# Patient Record
Sex: FEMALE | Race: WHITE | NOT HISPANIC OR LATINO | Employment: FULL TIME | URBAN - METROPOLITAN AREA
[De-identification: names, ages, dates, MRNs, and addresses within clinical notes are randomized per-mention and may not be internally consistent; named-entity substitution may affect disease eponyms.]

---

## 2017-08-02 ENCOUNTER — APPOINTMENT (OUTPATIENT)
Dept: WOMENS IMAGING | Facility: HOSPITAL | Age: 57
End: 2017-08-02

## 2017-08-02 PROCEDURE — 77063 BREAST TOMOSYNTHESIS BI: CPT | Performed by: RADIOLOGY

## 2017-08-02 PROCEDURE — G0202 SCR MAMMO BI INCL CAD: HCPCS | Performed by: RADIOLOGY

## 2017-08-02 PROCEDURE — MDREVIEWSP: Performed by: RADIOLOGY

## 2017-08-02 PROCEDURE — 77067 SCR MAMMO BI INCL CAD: CPT | Performed by: RADIOLOGY

## 2017-08-15 ENCOUNTER — HOSPITAL ENCOUNTER (OUTPATIENT)
Facility: HOSPITAL | Age: 57
Setting detail: HOSPITAL OUTPATIENT SURGERY
End: 2017-08-15
Attending: OBSTETRICS & GYNECOLOGY | Admitting: OBSTETRICS & GYNECOLOGY

## 2017-08-22 ENCOUNTER — APPOINTMENT (OUTPATIENT)
Dept: PREADMISSION TESTING | Facility: HOSPITAL | Age: 57
End: 2017-08-22

## 2017-08-22 VITALS
HEART RATE: 78 BPM | WEIGHT: 127 LBS | RESPIRATION RATE: 20 BRPM | TEMPERATURE: 98 F | OXYGEN SATURATION: 97 % | HEIGHT: 63 IN | DIASTOLIC BLOOD PRESSURE: 77 MMHG | BODY MASS INDEX: 22.5 KG/M2 | SYSTOLIC BLOOD PRESSURE: 122 MMHG

## 2017-08-22 LAB
B-HCG UR QL: NEGATIVE
BASOPHILS # BLD AUTO: 0.05 10*3/MM3 (ref 0–0.2)
BASOPHILS NFR BLD AUTO: 0.9 % (ref 0–1.5)
DEPRECATED RDW RBC AUTO: 44.9 FL (ref 37–54)
EOSINOPHIL # BLD AUTO: 0.09 10*3/MM3 (ref 0–0.7)
EOSINOPHIL NFR BLD AUTO: 1.6 % (ref 0.3–6.2)
ERYTHROCYTE [DISTWIDTH] IN BLOOD BY AUTOMATED COUNT: 13 % (ref 11.7–13)
HCT VFR BLD AUTO: 42.1 % (ref 35.6–45.5)
HGB BLD-MCNC: 13.6 G/DL (ref 11.9–15.5)
IMM GRANULOCYTES # BLD: 0 10*3/MM3 (ref 0–0.03)
IMM GRANULOCYTES NFR BLD: 0 % (ref 0–0.5)
LYMPHOCYTES # BLD AUTO: 1.74 10*3/MM3 (ref 0.9–4.8)
LYMPHOCYTES NFR BLD AUTO: 31.1 % (ref 19.6–45.3)
MCH RBC QN AUTO: 30.6 PG (ref 26.9–32)
MCHC RBC AUTO-ENTMCNC: 32.3 G/DL (ref 32.4–36.3)
MCV RBC AUTO: 94.6 FL (ref 80.5–98.2)
MONOCYTES # BLD AUTO: 0.55 10*3/MM3 (ref 0.2–1.2)
MONOCYTES NFR BLD AUTO: 9.8 % (ref 5–12)
NEUTROPHILS # BLD AUTO: 3.17 10*3/MM3 (ref 1.9–8.1)
NEUTROPHILS NFR BLD AUTO: 56.6 % (ref 42.7–76)
PLATELET # BLD AUTO: 267 10*3/MM3 (ref 140–500)
PMV BLD AUTO: 9.1 FL (ref 6–12)
RBC # BLD AUTO: 4.45 10*6/MM3 (ref 3.9–5.2)
WBC NRBC COR # BLD: 5.6 10*3/MM3 (ref 4.5–10.7)

## 2017-08-22 PROCEDURE — 36415 COLL VENOUS BLD VENIPUNCTURE: CPT

## 2017-08-22 PROCEDURE — 81025 URINE PREGNANCY TEST: CPT | Performed by: OBSTETRICS & GYNECOLOGY

## 2017-08-22 PROCEDURE — 85025 COMPLETE CBC W/AUTO DIFF WBC: CPT | Performed by: OBSTETRICS & GYNECOLOGY

## 2017-08-22 RX ORDER — LEVALBUTEROL TARTRATE 45 UG/1
1-2 AEROSOL, METERED ORAL EVERY 4 HOURS PRN
COMMUNITY
End: 2021-05-20

## 2017-08-22 RX ORDER — CETIRIZINE HYDROCHLORIDE 10 MG/1
10 TABLET ORAL AS NEEDED
COMMUNITY

## 2017-08-22 NOTE — DISCHARGE INSTRUCTIONS
Take the following medications the morning of surgery with a small sip of water:  INHALER    ARRIVAL TIME 0730 TO [facility]        General Instructions:  • Do not eat or drink anything after midnight the night before surgery.  • Infants may have breast milk up to four hours before surgery.  • Infants drinking formula may drink formula up to six hours before surgery.   • Patients who avoid smoking, chewing tobacco and alcohol for 4 weeks prior to surgery have a reduced risk of post-operative complications.  Quit smoking as many days before surgery as you can.  • Do not smoke, use chewing tobacco or drink alcohol the day of surgery.   • If applicable bring your C-PAP/ BI-PAP machine.  • Bring any papers given to you in the doctor’s office.  • Wear clean comfortable clothes and socks.  • Do not wear contact lenses or make-up.  Bring a case for your glasses.   • Bring crutches or walker if applicable.  • Leave all other valuables and jewelry at home.  • The Pre-Admission Testing nurse will instruct you to bring medications if unable to obtain an accurate list in Pre-Admission Testing.            Preventing a Surgical Site Infection:  • For 2 to 3 days before surgery, avoid shaving with a razor because the razor can irritate skin and make it easier to develop an infection.  • The night prior to surgery sleep in a clean bed with clean clothing.  Do not allow pets to sleep with you.  • Shower on the morning of surgery using a fresh bar of anti-bacterial soap (such as Dial) and clean washcloth.  Dry with a clean towel and dress in clean clothing.  • Ask your surgeon if you will be receiving antibiotics prior to surgery.  • Make sure you, your family, and all healthcare providers clean their hands with soap and water or an alcohol based hand  before caring for you or your wound.    Day of surgery:  Upon arrival, a Pre-op nurse and Anesthesiologist will review your health history, obtain vital signs, and  answer questions you may have.  The only belongings needed at this time will be your home medications and if applicable your C-PAP/BI-PAP machine.  If you are staying overnight your family can leave the rest of your belongings in the car and bring them to your room later.  A Pre-op nurse will start an IV and you may receive medication in preparation for surgery, including something to help you relax.  Your family will be able to see you in the Pre-op area.  While you are in surgery your family should notify the waiting room  if they leave the waiting room area and provide a contact phone number.    Please be aware that surgery does come with discomfort.  We want to make every effort to control your discomfort so please discuss any uncontrolled symptoms with your nurse.   Your doctor will most likely have prescribed pain medications.      If you are going home after surgery you will receive individualized written care instructions before being discharged.  A responsible adult must drive you to and from the hospital on the day of your surgery and stay with you for 24 hours.    If you are staying overnight following surgery, you will be transported to your hospital room following the recovery period.  [facility] has all private rooms.    If you have any questions please call Pre-Admission Testing at [phone].  Deductibles and co-payments are collected on the day of service. Please be prepared to pay the required co-pay, deductible or deposit on the day of service as defined by your plan.

## 2017-12-15 ENCOUNTER — OFFICE VISIT (OUTPATIENT)
Dept: INTERNAL MEDICINE | Facility: CLINIC | Age: 57
End: 2017-12-15

## 2017-12-15 VITALS
HEART RATE: 80 BPM | SYSTOLIC BLOOD PRESSURE: 118 MMHG | OXYGEN SATURATION: 99 % | DIASTOLIC BLOOD PRESSURE: 70 MMHG | RESPIRATION RATE: 18 BRPM | WEIGHT: 129 LBS | BODY MASS INDEX: 22.86 KG/M2 | HEIGHT: 63 IN

## 2017-12-15 DIAGNOSIS — J01.00 ACUTE NON-RECURRENT MAXILLARY SINUSITIS: Primary | ICD-10-CM

## 2017-12-15 DIAGNOSIS — J40 BRONCHITIS: ICD-10-CM

## 2017-12-15 PROCEDURE — 99213 OFFICE O/P EST LOW 20 MIN: CPT | Performed by: INTERNAL MEDICINE

## 2017-12-15 RX ORDER — METHYLPREDNISOLONE 4 MG/1
TABLET ORAL
Qty: 21 EACH | Refills: 0 | Status: SHIPPED | OUTPATIENT
Start: 2017-12-15 | End: 2019-02-12

## 2017-12-15 RX ORDER — INFLUENZA A VIRUS A/SINGAPORE/GP1908/2015 IVR-180 (H1N1) ANTIGEN (MDCK CELL DERIVED, PROPIOLACTONE INACTIVATED), INFLUENZA A VIRUS A/NORTH CAROLINA/04/2016 (H3N2) HEMAGGLUTININ ANTIGEN (MDCK CELL DERIVED, PROPIOLACTONE INACTIVATED), INFLUENZA B VIRUS B/IOWA/06/2017 HEMAGGLUTININ ANTIGEN (MDCK CELL DERIVED, PROPIOLACTONE INACTIVATED), INFLUENZA B VIRUS B/SINGAPORE/INFTT-16-0610/2016 HEMAGGLUTININ ANTIGEN (MDCK CELL DERIVED, PROPIOLACTONE INACTIVATED) 15; 15; 15; 15 UG/.5ML; UG/.5ML; UG/.5ML; UG/.5ML
INJECTION, SUSPENSION INTRAMUSCULAR
Refills: 0 | COMMUNITY
Start: 2017-09-25 | End: 2019-02-12

## 2017-12-15 RX ORDER — GUAIFENESIN AND CODEINE PHOSPHATE 100; 10 MG/5ML; MG/5ML
5 SOLUTION ORAL 3 TIMES DAILY PRN
Qty: 200 ML | Refills: 1 | Status: SHIPPED | OUTPATIENT
Start: 2017-12-15 | End: 2019-02-12

## 2017-12-15 RX ORDER — AZITHROMYCIN 250 MG/1
TABLET, FILM COATED ORAL
Qty: 5 TABLET | Refills: 0 | Status: SHIPPED | OUTPATIENT
Start: 2017-12-15 | End: 2019-02-12

## 2017-12-15 NOTE — PROGRESS NOTES
Chief Complaint   Patient presents with   • URI     congestion, headache, cough, x 2 weeks       History of Present Illness   Cecilia Mejia is a 57 y.o. female presents for acute care. C/o cough and congestion. Sinus pain and pressure. Vocal changes. Started about 21 days ago and worsening from that time. Has tried hydration, green tea, and mucinex without lasting benefit. Unable to sleep at night due to cough and it is worse in the evening and early morning. Reports low grade fever to 100.0.     The following portions of the patient's history were reviewed and updated as appropriate: allergies, current medications, past family history, past medical history, past social history, past surgical history and problem list.  Current Outpatient Prescriptions on File Prior to Visit   Medication Sig Dispense Refill   • cetirizine (zyrTEC) 10 MG tablet Take 10 mg by mouth As Needed for Allergies.     • Cholecalciferol (VITAMIN D PO) Take 1,000 Units by mouth Daily.     • Cyanocobalamin (VITAMIN B-12 SL) Place  under the tongue Daily.     • desonide (DESONATE) 0.05 % gel Apply 1 application topically As Needed.     • levalbuterol (XOPENEX HFA) 45 MCG/ACT inhaler Inhale 1-2 puffs Every 4 (Four) Hours As Needed for Wheezing.     • Tretinoin Microsphere Pump 0.04 % gel Place  on the skin As Needed.       No current facility-administered medications on file prior to visit.      Review of Systems   Constitutional: Positive for fatigue and fever.   HENT: Positive for postnasal drip, rhinorrhea, sinus pain, sinus pressure, sneezing, sore throat and voice change.    Eyes: Negative.    Respiratory: Positive for cough and wheezing.    Cardiovascular: Negative.    Gastrointestinal: Negative.    Endocrine: Negative.    Genitourinary: Negative.    Musculoskeletal: Negative for arthralgias.   Skin: Negative.    Allergic/Immunologic: Negative.    Neurological: Negative.    Hematological: Negative.    Psychiatric/Behavioral: Negative.   "      Objective   Physical Exam   Constitutional: She is oriented to person, place, and time. She appears well-developed and well-nourished.   Alert. No distress. Ill appearing.    HENT:   Head: Normocephalic and atraumatic.   Right Ear: External ear normal.   Left Ear: External ear normal.   Pharyngeal erythema   Eyes: EOM are normal. Pupils are equal, round, and reactive to light.   Neck: Normal range of motion. Neck supple.   Cardiovascular: Normal rate, regular rhythm, normal heart sounds and intact distal pulses.    Pulmonary/Chest: Effort normal and breath sounds normal.   Persistent cough   Abdominal: Soft. Bowel sounds are normal.   Musculoskeletal: Normal range of motion.   Neurological: She is alert and oriented to person, place, and time. She has normal reflexes.   Skin: Skin is warm and dry.   Psychiatric: She has a normal mood and affect. Her behavior is normal. Judgment and thought content normal.        /70  Pulse 80  Resp 18  Ht 160 cm (62.99\")  Wt 58.5 kg (129 lb)  SpO2 99%  BMI 22.86 kg/m2    Assessment/Plan   Diagnoses and all orders for this visit:    Acute non-recurrent maxillary sinusitis    Bronchitis    Other orders  -     FLUCELVAX QUADRIVALENT 0.5 ML suspension prefilled syringe injection; INJECT ONE INFLUENZA VACCINE PER PROTOCOL  -     azithromycin (ZITHROMAX) 250 MG tablet; Take 2 tablets the first day, then 1 tablet daily for 4 days.  -     MethylPREDNISolone (MEDROL, LUZ MARIA,) 4 MG tablet; Take as directed on package instructions.  -     guaifenesin-codeine (GUAIFENESIN AC) 100-10 MG/5ML liquid; Take 5 mL by mouth 3 (Three) Times a Day As Needed for Cough.             "

## 2018-07-06 ENCOUNTER — OFFICE VISIT (OUTPATIENT)
Dept: INTERNAL MEDICINE | Facility: CLINIC | Age: 58
End: 2018-07-06

## 2018-07-06 ENCOUNTER — HOSPITAL ENCOUNTER (OUTPATIENT)
Dept: CARDIOLOGY | Facility: HOSPITAL | Age: 58
Discharge: HOME | End: 2018-07-06
Admitting: INTERNAL MEDICINE

## 2018-07-06 VITALS
HEART RATE: 86 BPM | SYSTOLIC BLOOD PRESSURE: 110 MMHG | HEIGHT: 63 IN | OXYGEN SATURATION: 98 % | DIASTOLIC BLOOD PRESSURE: 70 MMHG | BODY MASS INDEX: 23.92 KG/M2 | WEIGHT: 135 LBS

## 2018-07-06 DIAGNOSIS — I83.93 VARICOSE VEINS OF BOTH LOWER EXTREMITIES: ICD-10-CM

## 2018-07-06 DIAGNOSIS — M79.89 LEFT LEG SWELLING: ICD-10-CM

## 2018-07-06 DIAGNOSIS — M79.662 PAIN OF LEFT CALF: ICD-10-CM

## 2018-07-06 DIAGNOSIS — M79.662 PAIN OF LEFT CALF: Primary | ICD-10-CM

## 2018-07-06 LAB
BH CV LOWER VASCULAR LEFT COMMON FEMORAL AUGMENT: NORMAL
BH CV LOWER VASCULAR LEFT COMMON FEMORAL COMPETENT: NORMAL
BH CV LOWER VASCULAR LEFT COMMON FEMORAL COMPRESS: NORMAL
BH CV LOWER VASCULAR LEFT COMMON FEMORAL PHASIC: NORMAL
BH CV LOWER VASCULAR LEFT COMMON FEMORAL SPONT: NORMAL
BH CV LOWER VASCULAR LEFT DISTAL FEMORAL COMPRESS: NORMAL
BH CV LOWER VASCULAR LEFT GASTRONEMIUS COMPRESS: NORMAL
BH CV LOWER VASCULAR LEFT GREATER SAPH AK COMPRESS: NORMAL
BH CV LOWER VASCULAR LEFT GREATER SAPH BK COMPRESS: NORMAL
BH CV LOWER VASCULAR LEFT LESSER SAPH COMPRESS: NORMAL
BH CV LOWER VASCULAR LEFT MID FEMORAL AUGMENT: NORMAL
BH CV LOWER VASCULAR LEFT MID FEMORAL COMPETENT: NORMAL
BH CV LOWER VASCULAR LEFT MID FEMORAL COMPRESS: NORMAL
BH CV LOWER VASCULAR LEFT MID FEMORAL PHASIC: NORMAL
BH CV LOWER VASCULAR LEFT MID FEMORAL SPONT: NORMAL
BH CV LOWER VASCULAR LEFT PERONEAL COMPRESS: NORMAL
BH CV LOWER VASCULAR LEFT POPLITEAL AUGMENT: NORMAL
BH CV LOWER VASCULAR LEFT POPLITEAL COMPETENT: NORMAL
BH CV LOWER VASCULAR LEFT POPLITEAL COMPRESS: NORMAL
BH CV LOWER VASCULAR LEFT POPLITEAL PHASIC: NORMAL
BH CV LOWER VASCULAR LEFT POPLITEAL SPONT: NORMAL
BH CV LOWER VASCULAR LEFT POSTERIOR TIBIAL COMPRESS: NORMAL
BH CV LOWER VASCULAR LEFT PROXIMAL FEMORAL COMPRESS: NORMAL
BH CV LOWER VASCULAR LEFT SAPHENOFEMORAL JUNCTION AUGMENT: NORMAL
BH CV LOWER VASCULAR LEFT SAPHENOFEMORAL JUNCTION COMPETENT: NORMAL
BH CV LOWER VASCULAR LEFT SAPHENOFEMORAL JUNCTION COMPRESS: NORMAL
BH CV LOWER VASCULAR LEFT SAPHENOFEMORAL JUNCTION PHASIC: NORMAL
BH CV LOWER VASCULAR LEFT SAPHENOFEMORAL JUNCTION SPONT: NORMAL
BH CV LOWER VASCULAR RIGHT COMMON FEMORAL AUGMENT: NORMAL
BH CV LOWER VASCULAR RIGHT COMMON FEMORAL COMPETENT: NORMAL
BH CV LOWER VASCULAR RIGHT COMMON FEMORAL COMPRESS: NORMAL
BH CV LOWER VASCULAR RIGHT COMMON FEMORAL PHASIC: NORMAL
BH CV LOWER VASCULAR RIGHT COMMON FEMORAL SPONT: NORMAL

## 2018-07-06 PROCEDURE — 99213 OFFICE O/P EST LOW 20 MIN: CPT | Performed by: INTERNAL MEDICINE

## 2018-07-06 PROCEDURE — 93971 EXTREMITY STUDY: CPT

## 2018-07-06 NOTE — PROGRESS NOTES
"Subjective     Cecilia Mejia is a 58 y.o. female who presents with   Chief Complaint   Patient presents with   • Leg Pain       History of Present Illness     C/o left calf pain.  She is worried about blood clot.  Some swelling.  She has varicose veins.    Left knee is also a chronic off and on bother.    Review of Systems   Respiratory: Negative for shortness of breath.        The following portions of the patient's history were reviewed and updated as appropriate: allergies, current medications and problem list.    Patient Active Problem List    Diagnosis Date Noted   • Arthritis of foot 11/03/2016   • Penicillin allergy 10/12/2016   • Atopic rhinitis 08/15/2016   • Hyperlipidemia 08/15/2016       Current Outpatient Prescriptions on File Prior to Visit   Medication Sig Dispense Refill   • cetirizine (zyrTEC) 10 MG tablet Take 10 mg by mouth As Needed for Allergies.     • Cholecalciferol (VITAMIN D PO) Take 1,000 Units by mouth Daily.     • Cyanocobalamin (VITAMIN B-12 SL) Place  under the tongue Daily.     • desonide (DESONATE) 0.05 % gel Apply 1 application topically As Needed.     • FLUCELVAX QUADRIVALENT 0.5 ML suspension prefilled syringe injection INJECT ONE INFLUENZA VACCINE PER PROTOCOL  0   • levalbuterol (XOPENEX HFA) 45 MCG/ACT inhaler Inhale 1-2 puffs Every 4 (Four) Hours As Needed for Wheezing.     • Tretinoin Microsphere Pump 0.04 % gel Place  on the skin As Needed.     • azithromycin (ZITHROMAX) 250 MG tablet Take 2 tablets the first day, then 1 tablet daily for 4 days. 5 tablet 0   • guaifenesin-codeine (GUAIFENESIN AC) 100-10 MG/5ML liquid Take 5 mL by mouth 3 (Three) Times a Day As Needed for Cough. 200 mL 1   • MethylPREDNISolone (MEDROL, LUZ MARIA,) 4 MG tablet Take as directed on package instructions. 21 each 0     No current facility-administered medications on file prior to visit.        Objective     /70   Pulse 86   Ht 160 cm (62.99\")   Wt 61.2 kg (135 lb)   SpO2 98%   BMI 23.92 kg/m² "     Physical Exam   Constitutional: She is oriented to person, place, and time. She appears well-developed and well-nourished.   HENT:   Head: Normocephalic and atraumatic.   Pulmonary/Chest: Effort normal.   Musculoskeletal:        Left knee: She exhibits swelling. She exhibits normal range of motion and no effusion.        Left lower leg: She exhibits tenderness and swelling. She exhibits no bony tenderness and no edema.   Neurological: She is alert and oriented to person, place, and time.   Psychiatric: She has a normal mood and affect. Her behavior is normal.       Assessment/Plan   Cecilia was seen today for leg pain.    Diagnoses and all orders for this visit:    Pain of left calf  -     Duplex Venous Lower Extremity - Left CAR; Future    Left leg swelling  -     Duplex Venous Lower Extremity - Left CAR; Future        Discussion  Patient presents with left leg swelling and pain.   STAT venous doppler to rule out DVT is ordered.    Varicose veins.  Trial of compression stockings.  Consider vascular referral.      Importance of annual exam discussed with the patient.             No future appointments.

## 2019-02-12 ENCOUNTER — OFFICE VISIT (OUTPATIENT)
Dept: INTERNAL MEDICINE | Facility: CLINIC | Age: 59
End: 2019-02-12

## 2019-02-12 VITALS
SYSTOLIC BLOOD PRESSURE: 118 MMHG | OXYGEN SATURATION: 98 % | BODY MASS INDEX: 23.39 KG/M2 | DIASTOLIC BLOOD PRESSURE: 88 MMHG | HEART RATE: 78 BPM | WEIGHT: 132 LBS

## 2019-02-12 DIAGNOSIS — M79.671 BILATERAL FOOT PAIN: Primary | ICD-10-CM

## 2019-02-12 DIAGNOSIS — M79.672 BILATERAL FOOT PAIN: Primary | ICD-10-CM

## 2019-02-12 PROCEDURE — 99213 OFFICE O/P EST LOW 20 MIN: CPT | Performed by: INTERNAL MEDICINE

## 2019-02-12 RX ORDER — MELOXICAM 15 MG/1
15 TABLET ORAL DAILY
Qty: 30 TABLET | Refills: 0 | Status: SHIPPED | OUTPATIENT
Start: 2019-02-12 | End: 2021-05-20

## 2019-02-12 NOTE — PROGRESS NOTES
Subjective     Cecilia Mejia is a 58 y.o. female who presents with   Chief Complaint   Patient presents with   • Foot Pain       History of Present Illness     Bilateral foot pain.  Started yesterday morning abruptly.  They were swollen and tender to touch.  No change in activity.  Sunday she worked from 9-3 on her feet.  No leg edema.  She is treat for foot OA by Dr. Price.      Review of Systems   Respiratory: Negative for shortness of breath.    Cardiovascular: Negative for chest pain.       The following portions of the patient's history were reviewed and updated as appropriate: allergies, current medications and problem list.    Patient Active Problem List    Diagnosis Date Noted   • Arthritis of foot 11/03/2016   • Penicillin allergy 10/12/2016   • Atopic rhinitis 08/15/2016   • Hyperlipidemia 08/15/2016       Current Outpatient Medications on File Prior to Visit   Medication Sig Dispense Refill   • cetirizine (zyrTEC) 10 MG tablet Take 10 mg by mouth As Needed for Allergies.     • Cholecalciferol (VITAMIN D PO) Take 1,000 Units by mouth Daily.     • Cyanocobalamin (VITAMIN B-12 SL) Place  under the tongue Daily.     • desonide (DESONATE) 0.05 % gel Apply 1 application topically As Needed.     • levalbuterol (XOPENEX HFA) 45 MCG/ACT inhaler Inhale 1-2 puffs Every 4 (Four) Hours As Needed for Wheezing.     • Tretinoin Microsphere Pump 0.04 % gel Place  on the skin As Needed.     • [DISCONTINUED] azithromycin (ZITHROMAX) 250 MG tablet Take 2 tablets the first day, then 1 tablet daily for 4 days. 5 tablet 0   • [DISCONTINUED] FLUCELVAX QUADRIVALENT 0.5 ML suspension prefilled syringe injection INJECT ONE INFLUENZA VACCINE PER PROTOCOL  0   • [DISCONTINUED] guaifenesin-codeine (GUAIFENESIN AC) 100-10 MG/5ML liquid Take 5 mL by mouth 3 (Three) Times a Day As Needed for Cough. 200 mL 1   • [DISCONTINUED] MethylPREDNISolone (MEDROL, LUZ MARIA,) 4 MG tablet Take as directed on package instructions. 21 each 0     No  current facility-administered medications on file prior to visit.        Objective     /88   Pulse 78   Wt 59.9 kg (132 lb)   SpO2 98%   BMI 23.39 kg/m²     Physical Exam   Constitutional: She is oriented to person, place, and time. She appears well-developed and well-nourished.   HENT:   Head: Normocephalic and atraumatic.   Pulmonary/Chest: Effort normal.   Musculoskeletal:        Right foot: There is tenderness. There is normal range of motion and no bony tenderness.        Left foot: There is tenderness. There is normal range of motion, no bony tenderness and no swelling.   Dorsal aspect of bilateral feet are tender.   Neurological: She is alert and oriented to person, place, and time.   Psychiatric: She has a normal mood and affect. Her behavior is normal.       Assessment/Plan   Cecilia was seen today for foot pain.    Diagnoses and all orders for this visit:    Bilateral foot pain    Other orders  -     meloxicam (MOBIC) 15 MG tablet; Take 1 tablet by mouth Daily.        Discussion  Patient presents with new bilateral dorsal foot pain after being on her feet for an extended period of time the day before.  She has upcoming f/u appointment with Dr. Price.  I discussed with the patient a trial of conservative management with:   NSAIDs, rest and ice.  Let me know if not feeling better over the next several weeks or if there is any change in symptoms.  If not improving I will order xrays but there is no evidence of acute trauma or fracture at this time.           Future Appointments   Date Time Provider Department Center   3/20/2019  9:50 AM Davidson Price MD [facility] [address] None

## 2020-12-01 ENCOUNTER — OFFICE VISIT (OUTPATIENT)
Dept: INTERNAL MEDICINE | Facility: CLINIC | Age: 60
End: 2020-12-01

## 2020-12-01 VITALS
OXYGEN SATURATION: 98 % | SYSTOLIC BLOOD PRESSURE: 120 MMHG | HEART RATE: 87 BPM | HEIGHT: 63 IN | WEIGHT: 135 LBS | BODY MASS INDEX: 23.92 KG/M2 | DIASTOLIC BLOOD PRESSURE: 90 MMHG

## 2020-12-01 DIAGNOSIS — R31.9 HEMATURIA, UNSPECIFIED TYPE: Primary | ICD-10-CM

## 2020-12-01 LAB
BILIRUB BLD-MCNC: NEGATIVE MG/DL
CLARITY, POC: ABNORMAL
COLOR UR: ABNORMAL
GLUCOSE UR STRIP-MCNC: NEGATIVE MG/DL
KETONES UR QL: NEGATIVE
LEUKOCYTE EST, POC: NEGATIVE
NITRITE UR-MCNC: NEGATIVE MG/ML
PH UR: 6.5 [PH] (ref 5–8)
PROT UR STRIP-MCNC: ABNORMAL MG/DL
RBC # UR STRIP: ABNORMAL /UL
SP GR UR: 1.02 (ref 1–1.03)
UROBILINOGEN UR QL: NORMAL

## 2020-12-01 PROCEDURE — 81003 URINALYSIS AUTO W/O SCOPE: CPT | Performed by: INTERNAL MEDICINE

## 2020-12-01 PROCEDURE — 99213 OFFICE O/P EST LOW 20 MIN: CPT | Performed by: INTERNAL MEDICINE

## 2020-12-01 RX ORDER — CIPROFLOXACIN 250 MG/1
250 TABLET, FILM COATED ORAL 2 TIMES DAILY
Qty: 14 TABLET | Refills: 0 | Status: SHIPPED | OUTPATIENT
Start: 2020-12-01 | End: 2021-05-20

## 2020-12-01 NOTE — PROGRESS NOTES
"Subjective     Cecilia Mejia is a 60 y.o. female who presents with   Chief Complaint   Patient presents with   • Blood in Urine   • Urinary Frequency       History of Present Illness     Going on a week.  Blood in urine.  No burning.  Some pressure and frequency.  No abd pain.  LBP is associated.      Review of Systems   Constitutional: Negative for fever.   Respiratory: Negative.    Cardiovascular: Negative.        The following portions of the patient's history were reviewed and updated as appropriate: allergies, current medications and problem list.    Patient Active Problem List    Diagnosis Date Noted   • Arthritis of foot 11/03/2016   • Penicillin allergy 10/12/2016   • Atopic rhinitis 08/15/2016   • Hyperlipidemia 08/15/2016       Current Outpatient Medications on File Prior to Visit   Medication Sig Dispense Refill   • cetirizine (zyrTEC) 10 MG tablet Take 10 mg by mouth As Needed for Allergies.     • Cholecalciferol (VITAMIN D PO) Take 1,000 Units by mouth Daily.     • Cyanocobalamin (VITAMIN B-12 SL) Place  under the tongue Daily.     • desonide (DESONATE) 0.05 % gel Apply 1 application topically As Needed.     • levalbuterol (XOPENEX HFA) 45 MCG/ACT inhaler Inhale 1-2 puffs Every 4 (Four) Hours As Needed for Wheezing.     • meloxicam (MOBIC) 15 MG tablet Take 1 tablet by mouth Daily. 30 tablet 0   • Tretinoin Microsphere Pump 0.04 % gel Place  on the skin As Needed.       No current facility-administered medications on file prior to visit.        Objective     /90   Pulse 87   Ht 160 cm (62.99\")   Wt 61.2 kg (135 lb)   SpO2 98%   BMI 23.92 kg/m²     Physical Exam  Constitutional:       Appearance: She is well-developed.   HENT:      Head: Normocephalic and atraumatic.   Pulmonary:      Effort: Pulmonary effort is normal.   Abdominal:      General: There is no distension.      Palpations: Abdomen is soft. There is no mass.      Tenderness: There is no abdominal tenderness. There is no guarding or " rebound.   Neurological:      Mental Status: She is alert.         Assessment/Plan   Diagnoses and all orders for this visit:    1. Hematuria, unspecified type (Primary)  -     POC Urinalysis Dipstick, Automated  -     Urine Culture - Urine, Urine, Clean Catch    Other orders  -     ciprofloxacin (Cipro) 250 MG tablet; Take 1 tablet by mouth 2 (Two) Times a Day.  Dispense: 14 tablet; Refill: 0        Discussion    Patient presents with gross hematuria likely acute cystitis.  Urine dip is positive for markers of infection.  A prescription for antibiotics is given.  Urine is send for culture and we will follow up on that.  Let us know if she is not feeling better of the next 48 hours.          No future appointments.

## 2020-12-03 LAB
BACTERIA UR CULT: NORMAL
BACTERIA UR CULT: NORMAL

## 2020-12-04 DIAGNOSIS — R31.0 GROSS HEMATURIA: Primary | ICD-10-CM

## 2021-03-26 ENCOUNTER — BULK ORDERING (OUTPATIENT)
Dept: CASE MANAGEMENT | Facility: OTHER | Age: 61
End: 2021-03-26

## 2021-03-26 DIAGNOSIS — Z23 IMMUNIZATION DUE: ICD-10-CM

## 2021-05-05 ENCOUNTER — TELEPHONE (OUTPATIENT)
Dept: INTERNAL MEDICINE | Facility: CLINIC | Age: 61
End: 2021-05-05

## 2021-05-05 NOTE — TELEPHONE ENCOUNTER
PATIENT CALLED STATING THAT SHE FOUND A TICK EMBEDDED IN HER HEAD. SHE REMOVED THE TICK AND DISINFECTED WITH ALCOHOL BUT WOULD LIKE TO KNOW IF THERE IS ANYTHING MORE THAT SHE NEEDS TO DO.    PLEASE ADVISE  [phone]

## 2021-05-05 NOTE — TELEPHONE ENCOUNTER
As long as she has not traveled to a Lyme endemic area like the [facility] there is nothing else she needs to do.  SHARMIN

## 2021-05-17 ENCOUNTER — TELEPHONE (OUTPATIENT)
Dept: ORTHOPEDIC SURGERY | Facility: CLINIC | Age: 61
End: 2021-05-17
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
## 2021-05-20 ENCOUNTER — OFFICE VISIT (OUTPATIENT)
Dept: ORTHOPEDIC SURGERY | Facility: CLINIC | Age: 61
End: 2021-05-20

## 2021-05-20 VITALS — HEIGHT: 64 IN | BODY MASS INDEX: 25.61 KG/M2 | TEMPERATURE: 98.9 F | WEIGHT: 150 LBS

## 2021-05-20 DIAGNOSIS — M17.12 PRIMARY LOCALIZED OSTEOARTHROSIS OF LEFT LOWER LEG: Primary | ICD-10-CM

## 2021-05-20 DIAGNOSIS — M25.562 LEFT KNEE PAIN, UNSPECIFIED CHRONICITY: ICD-10-CM

## 2021-05-20 PROCEDURE — 20610 DRAIN/INJ JOINT/BURSA W/O US: CPT | Performed by: ORTHOPAEDIC SURGERY

## 2021-05-20 PROCEDURE — 73562 X-RAY EXAM OF KNEE 3: CPT | Performed by: ORTHOPAEDIC SURGERY

## 2021-05-20 PROCEDURE — 99203 OFFICE O/P NEW LOW 30 MIN: CPT | Performed by: ORTHOPAEDIC SURGERY

## 2021-05-20 RX ORDER — LIDOCAINE HYDROCHLORIDE 20 MG/ML
4 INJECTION, SOLUTION EPIDURAL; INFILTRATION; INTRACAUDAL; PERINEURAL
Status: COMPLETED | OUTPATIENT
Start: 2021-05-20 | End: 2021-05-20

## 2021-05-20 RX ORDER — METHYLPREDNISOLONE ACETATE 80 MG/ML
80 INJECTION, SUSPENSION INTRA-ARTICULAR; INTRALESIONAL; INTRAMUSCULAR; SOFT TISSUE
Status: COMPLETED | OUTPATIENT
Start: 2021-05-20 | End: 2021-05-20

## 2021-05-20 RX ORDER — NAPROXEN SODIUM 220 MG
220 TABLET ORAL 2 TIMES DAILY PRN
COMMUNITY

## 2021-05-20 RX ADMIN — LIDOCAINE HYDROCHLORIDE 4 ML: 20 INJECTION, SOLUTION EPIDURAL; INFILTRATION; INTRACAUDAL; PERINEURAL at 11:56

## 2021-05-20 RX ADMIN — METHYLPREDNISOLONE ACETATE 80 MG: 80 INJECTION, SUSPENSION INTRA-ARTICULAR; INTRALESIONAL; INTRAMUSCULAR; SOFT TISSUE at 11:56

## 2021-05-20 NOTE — ADDENDUM NOTE
Addended by: MELANIA SWIFT on: 5/20/2021 06:46 PM     Modules accepted: Level of Service    
Sara De Dios(Attending)
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
## 2021-06-10 ENCOUNTER — TREATMENT (OUTPATIENT)
Dept: PHYSICAL THERAPY | Facility: CLINIC | Age: 61
End: 2021-06-10

## 2021-06-10 DIAGNOSIS — M25.662 KNEE STIFFNESS, LEFT: Primary | ICD-10-CM

## 2021-06-10 DIAGNOSIS — M17.12 PRIMARY LOCALIZED OSTEOARTHROSIS OF LEFT LOWER LEG: ICD-10-CM

## 2021-06-10 DIAGNOSIS — M25.562 LEFT KNEE PAIN, UNSPECIFIED CHRONICITY: ICD-10-CM

## 2021-06-10 PROCEDURE — 97110 THERAPEUTIC EXERCISES: CPT | Performed by: PHYSICAL THERAPIST

## 2021-06-10 PROCEDURE — 97162 PT EVAL MOD COMPLEX 30 MIN: CPT | Performed by: PHYSICAL THERAPIST

## 2021-06-10 NOTE — PROGRESS NOTES
Physical Therapy Initial Evaluation and Plan of Care      Patient: Cecilia Mejia   : 1960  Diagnosis/ICD-10 Code:  Knee stiffness, left [M25.662]  Referring practitioner: Davidson Price*  Date of Initial Visit: 6/10/2021  Today's Date: 2021  Patient seen for 1 sessions           Subjective Evaluation    History of Present Illness  Date of onset: 3/10/2021  Mechanism of injury: Knee surgery 3x in high school tore cartilage playing basketball  WALK a lot and bike ride MID April increased stiffness over the weekend and by Monday increased swelling that would not resolve with rest and ice      Ususally walks 5 miles/day now just doing 1       GAINED weight during covid   FOOT problem OA on top of foot and noted stiffness with DF/ PF   SCHOOL has a lot of steps which irritated  XRAYS show arthritis and CORTISONE INJECTION helped, decreased swelling   NOW ankle starting to bother   YESTERDAY hurting more with the rain   SLEEPING ok only occasional leg cramps  DENIES numbness tingling in foot or toes   Up and down from the floor    Subjective comment: L knee pain   Patient Occupation: teacher Quality of life: good    Pain  Current pain ratin  At best pain ratin  At worst pain ratin  Quality: dull ache, sharp and grinding  Relieving factors: ice (cortisone shot)  Progression: improved    Social Support  Lives in: multiple-level home (to basement )    Diagnostic Tests  X-ray: abnormal (OA )    Treatments  Current treatment: injection treatment  Patient Goals  Patient goals for therapy: decreased edema, decreased pain, increased strength and return to sport/leisure activities  Patient goal: walking and cycling  stairs without pain            Objective          Static Posture     Comments  POSTURE  LEFT lacking terminal knee extension in stance RIGHT higher   ROM  LEFT  4-90 limited by pain and stiffness  RIGHT WNL   MMT  Ok heel and toe walking LEFT hip f 4/5 with noted traunk compensation   Quad 3-/5 as not able to go thru full ROM    TRANSFERS  Sit to stand without compensation   SINGLE LEG STANCE   LEFT with hip dump RIGHT WNL   GAIT  Lacking terminal knee extension at stance phase and overall flexion compensation leading with head and poor weight transfer           EDUCATION and therex for   Terminal knee extension in sitting and supine  Hamstring stretch with rope 30 sec x 4  Single limb stance at mirror to minimize compensation   Ice  10-15 min 1-3x/day    walking with goal of terminal knee extension at stance phase       Functional Outcome Score: LEFS  60/80= 75% suggesting 25% dysfunciton         Assessment & Plan     Assessment  Impairments: abnormal gait, abnormal or restricted ROM, activity intolerance, impaired physical strength, lacks appropriate home exercise program and pain with function  Assessment details: Cecilia Mejia is a 61 y.o. year-old female referred to physical therapy for LEFT knee pain . She presents with a stable clinical presentation.  She has comorbidities 3 prior knee surgeries  and personal factors job standing and stairs that may affect her progress in the plan of care.  Signs and symptoms are consistent with physical therapy diagnosis of LEFT knee pain .   Prognosis: good  Functional Limitations: walking, uncomfortable because of pain, standing and unable to perform repetitive tasks  Goals  Plan Goals: STG: to be met by 6 weeks  1- Patient will report pain < 2 /10 with light household activities  2-Patient will increase passive EXTENSION  from  -4 to 0 without compensation or exacerbation   3-Patient will be independent with HEP without compensation or exacerbation   LTG: to be met by 12 weeks  1-Pt will report pain < 2 /10 with recreational activities   2-Pt will increase AROM 0-120 without compensation or exacerbation   3-Patient will improve posture to full terminal knee extension bilaterally and equal weight bearing   4-Pt will be walk without compensation   5-  Patient will be independent with comprehsive HEP     Plan  Therapy options: will be seen for skilled physical therapy services  Planned modality interventions: ultrasound  Planned therapy interventions: transfer training, therapeutic activities, stretching, strengthening, postural training, neuromuscular re-education, home exercise program, gait training, body mechanics training and manual therapy  Other planned therapy interventions: Aquatic therapy  Frequency: 2x week  Duration in weeks: 12  Treatment plan discussed with: patient (Diagnosis and plan of care)  Plan details: DURATION in visits 36        Timed:  Manual Therapy:    0     mins  27631;  Therapeutic Exercise:    20     mins  46575;     Neuromuscular Douglas:    0    mins  22900;    Therapeutic Activity:     0     mins  47488;     Gait Trainin     mins  04495;     Ultrasound:     0     mins  91198;    Electrical Stimulation:    0     mins  83481 ( );  Iontophoresis    0     mins 00290  Dry Needling   0     mins      Untimed:  Electrical Stimulation:    0     mins  08781 ( );  Mechanical Traction:    0     mins  70386;     Timed Treatment:   20   mins   Total Treatment:     45  mins    PT SIGNATURE: Nayely [place], PT   DATE TREATMENT INITIATED: 2021    Initial Certification  Certification Period: 9/15/2021  I certify that the therapy services are furnished while this patient is under my care.  The services outlined above are required by this patient, and will be reviewed every 90 days.     PHYSICIAN: Davidson Price MD      DATE:     Please sign and return via fax to [phone] Thank you, [facility].

## 2021-06-17 ENCOUNTER — TREATMENT (OUTPATIENT)
Dept: PHYSICAL THERAPY | Facility: CLINIC | Age: 61
End: 2021-06-17

## 2021-06-17 DIAGNOSIS — M25.562 LEFT KNEE PAIN, UNSPECIFIED CHRONICITY: ICD-10-CM

## 2021-06-17 DIAGNOSIS — M25.662 KNEE STIFFNESS, LEFT: Primary | ICD-10-CM

## 2021-06-17 DIAGNOSIS — M17.12 PRIMARY LOCALIZED OSTEOARTHROSIS OF LEFT LOWER LEG: ICD-10-CM

## 2021-06-17 PROCEDURE — 97112 NEUROMUSCULAR REEDUCATION: CPT | Performed by: PHYSICAL THERAPIST

## 2021-06-17 PROCEDURE — 97110 THERAPEUTIC EXERCISES: CPT | Performed by: PHYSICAL THERAPIST

## 2021-06-17 NOTE — PROGRESS NOTES
Physical Therapy Daily Progress Note    Patient: Cecilia Mejia   : 1960  Diagnosis/ICD-10 Code:  Knee stiffness, left [M25.662]  Referring practitioner: Davidson Price*  Date of Initial Visit: Type: THERAPY  Noted: 6/10/2021  Today's Date: 2021  Patient seen for 2 sessions           Subjective  Increased soreness after yard work up/down motion from weeding     Objective   Prone hang with MH and 1# x 3-4 min   Long sitting HAMSTRING stretch 3 segments 20 sec each x 4  SLR dede 10 in 3 positions  SIDE LYING hip abd x 10  All with verbal cueing for terminal knee extension during exercise     STANDING at mirror for feedback and verbal cueing   Single limb stance with verbal cueing for terminal knee extension   Step stance mechanics for pre swing weight shift thru great toe while keeping terminal knee extension     GAIT with verbal cueing for terminal knee extension during stance phase and proper heel toe push off mechanics   Still need to work on hip and pelvic stabilization     Assessment/Plan  A: significant improved terminal knee extension in standing and integration into gait with proper heel toe push off  PLAN progress ROM snd strengthening with integration into closed chain activities with possible return to tennis/ pickle ball          Timed:    Manual Therapy:    0     mins  21894;  Therapeutic Exercise:    25     mins  10381;     Neuromuscular Douglas:    15    mins  00256;    Therapeutic Activity:     0     mins  32297;     Gait Trainin     mins  59392;     Ultrasound:     0     mins  33348;    Electrical Stimulation:    0     mins  44622 ( );  Iontophoresis    0     mins 84505;  Aquatic Therapy    0     mins 56603;  Dry Needling              0     mins    Untimed:  Electrical Stimulation:    0     mins  67003 ( );  Mechanical Traction:    0     mins  01914;     Timed Treatment:   40   mins   Total Treatment:     40   mins  Nayely Colorado, NATALIIA  Physical Therapist

## 2021-06-24 ENCOUNTER — TREATMENT (OUTPATIENT)
Dept: PHYSICAL THERAPY | Facility: CLINIC | Age: 61
End: 2021-06-24

## 2021-06-24 DIAGNOSIS — M25.662 KNEE STIFFNESS, LEFT: Primary | ICD-10-CM

## 2021-06-24 DIAGNOSIS — M25.562 LEFT KNEE PAIN, UNSPECIFIED CHRONICITY: ICD-10-CM

## 2021-06-24 PROCEDURE — 97112 NEUROMUSCULAR REEDUCATION: CPT | Performed by: PHYSICAL THERAPIST

## 2021-06-24 PROCEDURE — 97110 THERAPEUTIC EXERCISES: CPT | Performed by: PHYSICAL THERAPIST

## 2021-06-24 NOTE — PROGRESS NOTES
Physical Therapy Daily Progress Note    Patient: Cecilia Mejia   : 1960  Diagnosis/ICD-10 Code:  Knee stiffness, left [M25.662]  Referring practitioner: Davidson Price*  Date of Initial Visit: Type: THERAPY  Noted: 6/10/2021  Today's Date: 2021  Patient seen for 3 sessions           Subjective Feeling better overall but increased pain/swelling anterior lateral     Objective    Prone hang x 5 min     Long sitting HAMSTRING stretch 3 segments 20 sec each x 4  SLR dede 10 in 3 positions  SIDE LYING hip abd x 10  All with verbal cueing for terminal knee extension during exercise     QS x 10  Standing terminal knee extension x 10     WALL sit with verbal cueing for weight on heels and slide 1/3  20 sec x 2  With goal of 1 min       Trial of kinesio taping anterior lateral knee with EDUCATION for proprioceptive facilitation      STANDING at mirror for feedback and verbal cueing   Single limb stance with verbal cueing for terminal knee extension with significant improved recruitment   Step stance mechanics  terminal knee extension working dynamic weight transfer + balance       Assessment/Plan  A: significant improvement in achieving terminal knee extension in stance phase but not able to fully extension 100% limited by pain  PLAN progress strengthening and closed chain activities with verbal cueing for terminal knee extension          Timed:    Manual Therapy:    0     mins  76932;  Therapeutic Exercise:    25     mins  86342;     Neuromuscular Douglas:    23    mins  44666;    Therapeutic Activity:     0     mins  25090;     Gait Trainin     mins  17434;     Ultrasound:     0     mins  15022;    Electrical Stimulation:    0     mins  46234 ( );  Iontophoresis    0     mins 94555;  Aquatic Therapy    0     mins 65673;  Dry Needling              0     mins    Untimed:  Electrical Stimulation:    0     mins  28085 ( );  Mechanical Traction:    0     mins  91122;     Timed Treatment:    48   mins   Total Treatment:     48   mins  Nayely Colorado, PT  Physical Therapist

## 2021-07-01 ENCOUNTER — TREATMENT (OUTPATIENT)
Dept: PHYSICAL THERAPY | Facility: CLINIC | Age: 61
End: 2021-07-01

## 2021-07-01 DIAGNOSIS — M25.562 LEFT KNEE PAIN, UNSPECIFIED CHRONICITY: ICD-10-CM

## 2021-07-01 DIAGNOSIS — M25.662 KNEE STIFFNESS, LEFT: Primary | ICD-10-CM

## 2021-07-01 PROCEDURE — 97112 NEUROMUSCULAR REEDUCATION: CPT | Performed by: PHYSICAL THERAPIST

## 2021-07-01 PROCEDURE — 97110 THERAPEUTIC EXERCISES: CPT | Performed by: PHYSICAL THERAPIST

## 2021-07-01 PROCEDURE — 97116 GAIT TRAINING THERAPY: CPT | Performed by: PHYSICAL THERAPIST

## 2021-07-01 NOTE — PROGRESS NOTES
Physical Therapy Daily Progress Note    Patient: Cecilia Mejia   : 1960  Diagnosis/ICD-10 Code:  Knee stiffness, left [M25.662]  Referring practitioner: Davidson Price*  Date of Initial Visit: Type: THERAPY  Noted: 6/10/2021  Today's Date: 2021  Patient seen for 4 sessions           Subjective   Starting to feel more normal  Walking more distance and faster  Doing 2 miles   Objective     Discussed HEP and progression of quad strengthening exercises   EDUCATION to keep walking to 20-30 min to avoid overuse/ joint protection strategies   Wall sit with verbal cueing for weight on heels and increase time GOAL 1 min    SINGLE LEG STANCE on LEFT is WNL with noted slow activation of core to stabilize pelvis   Dynamics step balance work with verbal cueing for core integration during stance phase     Increase speed and verbal cueing to soften heel strike for decreased shock thru leg      Assessment/Plan    A: progressing well with walking and able to incresae speed and distance without exacerbation    PLAN: reassess in 1week     Timed:    Manual Therapy:    0     mins  78954;  Therapeutic Exercise:    8     mins  41722;     Neuromuscular Douglas:    8   mins  75279;    Therapeutic Activity:     0     mins  68353;     Gait Trainin    mins  08063;     Ultrasound:     0     mins  96808;    Electrical Stimulation:    0     mins  09499 ( );  Iontophoresis    0     mins 76311;  Aquatic Therapy    0     mins 40371;  Dry Needling              0     mins    Untimed:  Electrical Stimulation:    0     mins  93631 ( );  Mechanical Traction:    0     mins  76333;     Timed Treatment:   24   mins   Total Treatment:     24   mins  Nayely [place], PT  Physical Therapist

## 2021-07-13 ENCOUNTER — TREATMENT (OUTPATIENT)
Dept: PHYSICAL THERAPY | Facility: CLINIC | Age: 61
End: 2021-07-13

## 2021-07-13 DIAGNOSIS — M25.662 KNEE STIFFNESS, LEFT: Primary | ICD-10-CM

## 2021-07-13 DIAGNOSIS — M25.562 LEFT KNEE PAIN, UNSPECIFIED CHRONICITY: ICD-10-CM

## 2021-07-13 DIAGNOSIS — M17.12 PRIMARY LOCALIZED OSTEOARTHROSIS OF LEFT LOWER LEG: ICD-10-CM

## 2021-07-13 PROCEDURE — 97112 NEUROMUSCULAR REEDUCATION: CPT | Performed by: PHYSICAL THERAPIST

## 2021-07-13 PROCEDURE — 97110 THERAPEUTIC EXERCISES: CPT | Performed by: PHYSICAL THERAPIST

## 2021-07-13 NOTE — PROGRESS NOTES
Physical Therapy Daily Progress Note    Patient: Cecilia Mejia   : 1960  Diagnosis/ICD-10 Code:  Knee stiffness, left [M25.662]  Referring practitioner: Davidson Price*  Date of Initial Visit: Type: THERAPY  Noted: 6/10/2021  Today's Date: 2021  Patient seen for 5 sessions           Subjective tried walking faster and then swelling returned     Objective   Hip hiking x10 bilaterally     Dynamic work at mirror for   Toe off with terminal knee extension for improved weight transfer   Step balance for dynamic quickness but RIGHT side continues to compensation  as LEFT knee not able to fully extend knee      PRONE hand with heat and 1#  3 min    SIDE LYING hip abd x 15 with verbal and tactile cueing for proper technique    Rolling toward belly, knee straight, ankle flexed, lift leading with heel, up and squeeze gluts    X 10 bilaterally     Hamstring stretch with rope in 3 segments 20 sec each for 1 min x 2 goal of 4  Pretzel stretch x 30 sec x 2   Sitting versions of each with goal to do frequently thru the day     ABS    Leg press with posterior pelvic tilt for dynamic lumbar stabilization  GOAL 1 min   Obliques with posterior pelvic tilt for dynamic lumbar stabilization  GOAL 1 min     EDUCATION for Interval training to walk fast for short time then back to regular jolanta  To increase speed without stress/swelling to joint      Assessment/Plan  A: LEFT lacking terminal knee extension affecting weight transfer creating pelvic compensation and increased swelling in joint   PLAN reassess  single limb work and integrating into dynamic closed chain activities          Timed:    Manual Therapy:    0     mins  05639;  Therapeutic Exercise:    17     mins  98784;     Neuromuscular Douglas:    25    mins  10601;    Therapeutic Activity:     0     mins  32459;     Gait Trainin     mins  01123;     Ultrasound:     0     mins  75815;    Electrical Stimulation:    0     mins  80530 (MELISSA  );  Iontophoresis    0     mins 57472;  Aquatic Therapy    0     mins 47647;  Dry Needling              0     mins    Untimed:  Electrical Stimulation:    0     mins  91306 ( );  Mechanical Traction:    0     mins  34402;     Timed Treatment:   42   mins   Total Treatment:     42   mins  Nayely [place], PT  Physical Therapist

## 2021-08-03 ENCOUNTER — TREATMENT (OUTPATIENT)
Dept: PHYSICAL THERAPY | Facility: CLINIC | Age: 61
End: 2021-08-03

## 2021-08-03 DIAGNOSIS — M25.562 LEFT KNEE PAIN, UNSPECIFIED CHRONICITY: ICD-10-CM

## 2021-08-03 DIAGNOSIS — M25.662 KNEE STIFFNESS, LEFT: Primary | ICD-10-CM

## 2021-08-03 DIAGNOSIS — M17.12 PRIMARY LOCALIZED OSTEOARTHROSIS OF LEFT LOWER LEG: ICD-10-CM

## 2021-08-03 PROCEDURE — 97110 THERAPEUTIC EXERCISES: CPT | Performed by: PHYSICAL THERAPIST

## 2021-08-03 PROCEDURE — 97112 NEUROMUSCULAR REEDUCATION: CPT | Performed by: PHYSICAL THERAPIST

## 2021-08-03 NOTE — PROGRESS NOTES
30-Day / 10-Visit Progress Note         Patient: Cecilia Mejia   : 1960  Diagnosis/ICD-10 Code:  Knee stiffness, left [M25.662]  Referring practitioner: Davidson Price*  Date of Initial Visit: Type: THERAPY  Noted: 6/10/2021  Today's Date: 8/3/2021  Patient seen for 6 sessions      Subjective:     Clinical Progress: improved  Home Program Compliance: Yes  Treatment has included:  therapeutic exercise, manual therapy, neuro-muscular retraining , gait training and patient education with home exercise program     Subjective 70% back to normal   Objective   PROM 0-131    SINGLE LEG STANCE 20+ sec without pelvic compensation      WALKING hard hit on heel strike but adapts well to push thru great toe cue with core/ pelvic floor activation         SLR   RIGHT  90  LEFT  80 with compensation of wanting ot flex knee   JANIS   RIGHT stiff 25%  LEFT  Stiff 15 %     EDUCATION for progression of walking prohts,  Time milage  inervals     HAMSTRING stretch in supine with verbal cueing for terminal knee extension   BUTTERFLY STRETCH      Figure 4 self test   SIDE LYING hip abd  X 15 bilaterally           Assessment/Plan     Goals  Plan Goals: STG: to be met by 6 weeks  1- Patient will report pain < 2 /10 with light household activities     MET   2-Patient will increase passive EXTENSION  from  -4 to 0 without compensation or exacerbation      MET   3-Patient will be independent with HEP without compensation or exacerbation      MET   LTG: to be met by 12 weeks  1-Pt will report pain < 2 /10 with recreational activities   2-Pt will increase AROM 0-120 without compensation or exacerbation   3-Patient will improve posture to full terminal knee extension bilaterally and equal weight bearing   4-Pt will be walk without compensation   5- Patient will be independent with comprehsive HEP        Recommendations: Continue as planned  Timeframe: 2 months  Prognosis to achieve goals: good    PT Signature: Nayely Colorado,  PT      Based upon review of the patient's progress and continued therapy plan, it is my medical opinion that Cecilia Mejia should continue physical therapy treatment at [facility]  [address] [place] [address] [address] [place] [address] [phone].    Signature: __________________________________  Davidson Price MD    Timed:  Manual Therapy:    0     mins  73978;  Therapeutic Exercise:    30     mins  54744;     Neuromuscular Douglas:    13    mins  90060;    Therapeutic Activity:     0     mins  67543;     Gait Trainin     mins  62728;     Ultrasound:     0     mins  45632;    Electrical Stimulation:    0     mins  23833 ( );  Iontophoresis    0     mins 36797;  Dry Needling              0     mins    Untimed:  Electrical Stimulation:    0     mins  05916 ( );  Mechanical Traction:    0     mins  89637;     Timed Treatment:   43   mins   Total Treatment:     43   mins

## 2021-09-03 ENCOUNTER — DOCUMENTATION (OUTPATIENT)
Dept: PHYSICAL THERAPY | Facility: CLINIC | Age: 61
End: 2021-09-03

## 2021-09-03 DIAGNOSIS — M25.662 KNEE STIFFNESS, LEFT: Primary | ICD-10-CM

## 2021-09-03 DIAGNOSIS — M17.12 PRIMARY LOCALIZED OSTEOARTHROSIS OF LEFT LOWER LEG: ICD-10-CM

## 2021-09-03 DIAGNOSIS — M25.562 LEFT KNEE PAIN, UNSPECIFIED CHRONICITY: ICD-10-CM

## 2021-11-04 ENCOUNTER — TELEPHONE (OUTPATIENT)
Dept: INTERNAL MEDICINE | Facility: CLINIC | Age: 61
End: 2021-11-04

## 2022-04-26 ENCOUNTER — OFFICE VISIT (OUTPATIENT)
Dept: INTERNAL MEDICINE | Facility: CLINIC | Age: 62
End: 2022-04-26

## 2022-04-26 VITALS
HEIGHT: 64 IN | DIASTOLIC BLOOD PRESSURE: 70 MMHG | WEIGHT: 147 LBS | RESPIRATION RATE: 12 BRPM | OXYGEN SATURATION: 96 % | HEART RATE: 74 BPM | TEMPERATURE: 97.1 F | SYSTOLIC BLOOD PRESSURE: 120 MMHG | BODY MASS INDEX: 25.1 KG/M2

## 2022-04-26 DIAGNOSIS — Z00.00 LABORATORY TESTS ORDERED AS PART OF A COMPLETE PHYSICAL EXAM (CPE): ICD-10-CM

## 2022-04-26 DIAGNOSIS — Z12.11 COLON CANCER SCREENING: ICD-10-CM

## 2022-04-26 DIAGNOSIS — Z13.6 ENCOUNTER FOR SCREENING FOR VASCULAR DISEASE: ICD-10-CM

## 2022-04-26 DIAGNOSIS — J20.9 ACUTE BRONCHITIS, UNSPECIFIED ORGANISM: ICD-10-CM

## 2022-04-26 DIAGNOSIS — Z00.00 WELL ADULT EXAM: Primary | ICD-10-CM

## 2022-04-26 PROBLEM — M85.89 OSTEOPENIA OF MULTIPLE SITES: Status: ACTIVE | Noted: 2022-04-26

## 2022-04-26 PROCEDURE — 99396 PREV VISIT EST AGE 40-64: CPT | Performed by: INTERNAL MEDICINE

## 2022-04-26 RX ORDER — ALBUTEROL SULFATE 90 UG/1
2 AEROSOL, METERED RESPIRATORY (INHALATION) EVERY 4 HOURS PRN
Qty: 18 G | Refills: 0 | Status: SHIPPED | OUTPATIENT
Start: 2022-04-26

## 2022-04-26 RX ORDER — MOMETASONE FUROATE 1 MG/G
CREAM TOPICAL
COMMUNITY
Start: 2022-04-20

## 2022-04-26 RX ORDER — AZITHROMYCIN 250 MG/1
TABLET, FILM COATED ORAL
Qty: 6 TABLET | Refills: 0 | Status: SHIPPED | OUTPATIENT
Start: 2022-04-26 | End: 2022-05-03

## 2022-04-26 RX ORDER — GUAIFENESIN AND CODEINE PHOSPHATE 100; 10 MG/5ML; MG/5ML
5 SOLUTION ORAL 3 TIMES DAILY PRN
Qty: 118 ML | Refills: 0 | Status: SHIPPED | OUTPATIENT
Start: 2022-04-26 | End: 2022-05-03

## 2022-04-26 NOTE — PROGRESS NOTES
Subjective     Cecilia Mejia is a 61 y.o. female who presents for a complete physical exam.      History of Present Illness     C/o cough for one month.  Started with a sore throat.  SOA is associated.  Sinus pain and congestion.  Chest discomfort.  Cough is with production.      Review of Systems   Constitutional: Negative.    HENT: Negative.    Eyes: Negative.    Gastrointestinal: Negative.    Endocrine: Negative.    Genitourinary: Negative.    Musculoskeletal: Negative.    Skin: Negative.    Allergic/Immunologic: Negative.    Neurological: Negative.    Hematological: Negative.    Psychiatric/Behavioral: Negative.        The following portions of the patient's history were reviewed and updated as appropriate: allergies, current medications, past family history, past medical history, past social history, past surgical history and problem list.  Health maintenance tab was reviewed and updated with the patient.       Patient Active Problem List    Diagnosis Date Noted   • Osteopenia of multiple sites 04/26/2022   • Primary localized osteoarthrosis of left lower leg 05/20/2021   • Arthritis of foot 11/03/2016   • Penicillin allergy 10/12/2016   • Atopic rhinitis 08/15/2016   • Hyperlipidemia 08/15/2016       Past Medical History:   Diagnosis Date   • Arthritis    • Bartholin's cyst    • Cough     S.T ALLERGIES, INHALER PRN   • History of basal cell cancer    • Hyperlipidemia     DIET CONTROLLED   • Osteopenia    • PONV (postoperative nausea and vomiting)    • Seasonal allergies        Past Surgical History:   Procedure Laterality Date   • KNEE SURGERY Left     X3   • MOHS SURGERY      FACIAL   • TONSILLECTOMY     • WISDOM TOOTH EXTRACTION         Family History   Problem Relation Age of Onset   • Hypertension Mother    • Aortic aneurysm Father    • Heart disease Father    • Aortic aneurysm Paternal Grandmother    • Malig Hyperthermia Neg Hx        Social History     Socioeconomic History   • Marital status:       "Spouse name: LUIS FERNANDO   • Number of children: 1   Tobacco Use   • Smoking status: Never Smoker   • Smokeless tobacco: Never Used   Substance and Sexual Activity   • Alcohol use: No   • Drug use: No       Current Outpatient Medications on File Prior to Visit   Medication Sig Dispense Refill   • cetirizine (zyrTEC) 10 MG tablet Take 10 mg by mouth As Needed for Allergies.     • mometasone (ELOCON) 0.1 % cream APPLY ONCE OR TWICE DAILY AS NEEDED TO RASH ON FACE AND EARS     • naproxen sodium (ALEVE) 220 MG tablet Take 220 mg by mouth 2 (Two) Times a Day As Needed.       No current facility-administered medications on file prior to visit.       Allergies   Allergen Reactions   • Cephalosporins    • Keflex [Cephalexin] Swelling     THROAT SWELLING, SOA   • Penicillins Swelling     THROAT SWELLING, SOA   • Sulfa Antibiotics Rash       Immunization History   Administered Date(s) Administered   • COVID-19 (MODERNA) 1st, 2nd, 3rd Dose Only 01/26/2021, 02/23/2021, 11/04/2021   • Flu Vaccine Quad PF >36MO 09/12/2020, 10/19/2021   • Fluzone Split Quad (Multi-dose) 09/25/2019   • Influenza TIV (IM) 10/14/2016   • Td 01/01/2006   • Tdap 08/15/2016       Objective     /70   Pulse 74   Temp 97.1 °F (36.2 °C)   Resp 12   Ht 161.3 cm (63.5\")   Wt 66.7 kg (147 lb)   SpO2 96%   BMI 25.63 kg/m²     Physical Exam  Constitutional:       Appearance: She is well-developed.   HENT:      Head: Normocephalic and atraumatic.      Right Ear: Hearing, tympanic membrane and external ear normal.      Left Ear: Hearing, tympanic membrane and external ear normal.      Nose: Nose normal.   Neck:      Thyroid: No thyromegaly.   Cardiovascular:      Rate and Rhythm: Normal rate and regular rhythm.      Heart sounds: Normal heart sounds. No murmur heard.  Pulmonary:      Effort: Pulmonary effort is normal.      Breath sounds: Normal breath sounds.   Chest:   Breasts:      Right: No mass.      Left: No mass.       Abdominal:      General: " There is no distension.      Palpations: Abdomen is soft.      Tenderness: There is no abdominal tenderness.   Musculoskeletal:      Cervical back: Neck supple.   Lymphadenopathy:      Cervical: No cervical adenopathy.   Skin:     General: Skin is warm and dry.   Neurological:      Mental Status: She is alert and oriented to person, place, and time.   Psychiatric:         Speech: Speech normal.         Behavior: Behavior normal.         Thought Content: Thought content normal.         Judgment: Judgment normal.         Assessment/Plan   Diagnoses and all orders for this visit:    1. Well adult exam (Primary)    2. Laboratory tests ordered as part of a complete physical exam (CPE)  -     CBC & Differential  -     Comprehensive Metabolic Panel  -     Lipid Panel  -     TSH Rfx On Abnormal To Free T4  -     Urinalysis With Microscopic - Urine, Clean Catch  -     Hepatitis C Antibody    3. Encounter for screening for vascular disease  -     Vascular Screening (Bundle) CAR; Future    4. Colon cancer screening  -     Ambulatory Referral For Screening Colonoscopy    5. Acute bronchitis, unspecified organism  -     guaiFENesin-codeine (GUAIFENESIN AC) 100-10 MG/5ML liquid; Take 5 mL by mouth 3 (Three) Times a Day As Needed for Cough.  Dispense: 118 mL; Refill: 0    Other orders  -     albuterol sulfate  (90 Base) MCG/ACT inhaler; Inhale 2 puffs Every 4 (Four) Hours As Needed for Wheezing.  Dispense: 18 g; Refill: 0  -     azithromycin (Zithromax Z-Cain) 250 MG tablet; Take 2 tablets the first day, then 1 tablet daily for 4 days.  Dispense: 6 tablet; Refill: 0        Discussion    Patient presents today for a CPE.      Patient follows a healthy diet.   Patient follows an adequate exercise regimen. Mammogram is up to date.   Patient has been referred for colon cancer screening.   Pap smears are performed by the patient's gynecologist.  I have recommended that the patient get the following immunizations:   Shingrix.  Patient presents with episode of acute bronchitis.  A prescription for antibiotics is provided today.  The patient is instructed to take along with prn inhaler and cough syrup at night.  Let me know they are not feeling better over the next 3 days or if there is any change in symptoms.          Health Maintenance   Topic Date Due   • COLORECTAL CANCER SCREENING  Never done   • ZOSTER VACCINE (1 of 2) Never done   • HEPATITIS C SCREENING  Never done   • ANNUAL PHYSICAL  08/16/2017   • DXA SCAN  10/23/2017   • MAMMOGRAM  01/01/2018   • PAP SMEAR  01/01/2019   • LIPID PANEL  06/29/2022   • INFLUENZA VACCINE  08/01/2022   • TDAP/TD VACCINES (3 - Td or Tdap) 08/15/2026   • COVID-19 Vaccine  Completed   • Pneumococcal Vaccine 0-64  Aged Out            No future appointments.

## 2022-04-27 LAB
ALBUMIN SERPL-MCNC: 4.7 G/DL (ref 3.8–4.9)
ALBUMIN/GLOB SERPL: 2.1 {RATIO} (ref 1.2–2.2)
ALP SERPL-CCNC: 108 IU/L (ref 44–121)
ALT SERPL-CCNC: 16 IU/L (ref 0–32)
APPEARANCE UR: ABNORMAL
AST SERPL-CCNC: 18 IU/L (ref 0–40)
BACTERIA #/AREA URNS HPF: ABNORMAL /[HPF]
BASOPHILS # BLD AUTO: 0.1 X10E3/UL (ref 0–0.2)
BASOPHILS NFR BLD AUTO: 1 %
BILIRUB SERPL-MCNC: 1.1 MG/DL (ref 0–1.2)
BILIRUB UR QL STRIP: NEGATIVE
BUN SERPL-MCNC: 11 MG/DL (ref 8–27)
BUN/CREAT SERPL: 14 (ref 12–28)
CALCIUM SERPL-MCNC: 10 MG/DL (ref 8.7–10.3)
CASTS URNS QL MICRO: ABNORMAL /LPF
CHLORIDE SERPL-SCNC: 101 MMOL/L (ref 96–106)
CHOLEST SERPL-MCNC: 249 MG/DL (ref 100–199)
CO2 SERPL-SCNC: 24 MMOL/L (ref 20–29)
COLOR UR: YELLOW
CREAT SERPL-MCNC: 0.78 MG/DL (ref 0.57–1)
CRYSTALS URNS MICRO: ABNORMAL
EGFRCR SERPLBLD CKD-EPI 2021: 87 ML/MIN/1.73
EOSINOPHIL # BLD AUTO: 0.3 X10E3/UL (ref 0–0.4)
EOSINOPHIL NFR BLD AUTO: 4 %
EPI CELLS #/AREA URNS HPF: ABNORMAL /HPF (ref 0–10)
ERYTHROCYTE [DISTWIDTH] IN BLOOD BY AUTOMATED COUNT: 12.3 % (ref 11.7–15.4)
GLOBULIN SER CALC-MCNC: 2.2 G/DL (ref 1.5–4.5)
GLUCOSE SERPL-MCNC: 95 MG/DL (ref 65–99)
GLUCOSE UR QL STRIP: NEGATIVE
HCT VFR BLD AUTO: 42.9 % (ref 34–46.6)
HCV AB S/CO SERPL IA: <0.1 S/CO RATIO (ref 0–0.9)
HDLC SERPL-MCNC: 105 MG/DL
HGB BLD-MCNC: 14.3 G/DL (ref 11.1–15.9)
HGB UR QL STRIP: NEGATIVE
IMM GRANULOCYTES # BLD AUTO: 0 X10E3/UL (ref 0–0.1)
IMM GRANULOCYTES NFR BLD AUTO: 0 %
KETONES UR QL STRIP: ABNORMAL
LDLC SERPL CALC-MCNC: 130 MG/DL (ref 0–99)
LEUKOCYTE ESTERASE UR QL STRIP: NEGATIVE
LYMPHOCYTES # BLD AUTO: 1.3 X10E3/UL (ref 0.7–3.1)
LYMPHOCYTES NFR BLD AUTO: 22 %
MCH RBC QN AUTO: 30.2 PG (ref 26.6–33)
MCHC RBC AUTO-ENTMCNC: 33.3 G/DL (ref 31.5–35.7)
MCV RBC AUTO: 91 FL (ref 79–97)
MICRO URNS: ABNORMAL
MICRO URNS: ABNORMAL
MONOCYTES # BLD AUTO: 0.5 X10E3/UL (ref 0.1–0.9)
MONOCYTES NFR BLD AUTO: 8 %
NEUTROPHILS # BLD AUTO: 3.9 X10E3/UL (ref 1.4–7)
NEUTROPHILS NFR BLD AUTO: 65 %
NITRITE UR QL STRIP: NEGATIVE
PH UR STRIP: 5.5 [PH] (ref 5–7.5)
PLATELET # BLD AUTO: 311 X10E3/UL (ref 150–450)
POTASSIUM SERPL-SCNC: 4.3 MMOL/L (ref 3.5–5.2)
PROT SERPL-MCNC: 6.9 G/DL (ref 6–8.5)
PROT UR QL STRIP: ABNORMAL
RBC # BLD AUTO: 4.74 X10E6/UL (ref 3.77–5.28)
RBC #/AREA URNS HPF: ABNORMAL /HPF (ref 0–2)
SODIUM SERPL-SCNC: 140 MMOL/L (ref 134–144)
SP GR UR STRIP: 1.02 (ref 1–1.03)
TRIGL SERPL-MCNC: 84 MG/DL (ref 0–149)
TSH SERPL DL<=0.005 MIU/L-ACNC: 3.16 UIU/ML (ref 0.45–4.5)
UNIDENT CRYS URNS QL MICRO: PRESENT
UROBILINOGEN UR STRIP-MCNC: 0.2 MG/DL (ref 0.2–1)
VLDLC SERPL CALC-MCNC: 14 MG/DL (ref 5–40)
WBC # BLD AUTO: 6.1 X10E3/UL (ref 3.4–10.8)
WBC #/AREA URNS HPF: ABNORMAL /HPF (ref 0–5)

## 2022-05-03 ENCOUNTER — OFFICE VISIT (OUTPATIENT)
Dept: INTERNAL MEDICINE | Facility: CLINIC | Age: 62
End: 2022-05-03

## 2022-05-03 VITALS
WEIGHT: 145 LBS | HEIGHT: 64 IN | OXYGEN SATURATION: 98 % | SYSTOLIC BLOOD PRESSURE: 120 MMHG | DIASTOLIC BLOOD PRESSURE: 90 MMHG | HEART RATE: 68 BPM | BODY MASS INDEX: 24.75 KG/M2

## 2022-05-03 DIAGNOSIS — R42 DIZZINESS: Primary | ICD-10-CM

## 2022-05-03 PROCEDURE — 99213 OFFICE O/P EST LOW 20 MIN: CPT | Performed by: INTERNAL MEDICINE

## 2022-05-03 PROCEDURE — 93000 ELECTROCARDIOGRAM COMPLETE: CPT | Performed by: INTERNAL MEDICINE

## 2022-05-03 NOTE — PROGRESS NOTES
Subjective     Cecilia Mejia is a 61 y.o. female who presents with   Chief Complaint   Patient presents with   • URI   • Dizziness       History of Present Illness       C/o cough for one month.  Started with a sore throat.  SOA is associated.  Sinus pain and congestion.  Chest discomfort. Cough is with production.  She has finish the zpak.  She used the cough medicine.  She felt generally weak at work.  She felt dizziness.  She hadn't eaten or drank much.  She felt like she was going to pass out when standing quickly.  No CP.  No SOA.      Review of Systems   Constitutional: Negative for fever.   Respiratory: Positive for cough. Negative for chest tightness and shortness of breath.    Cardiovascular: Negative for chest pain and palpitations.   Neurological: Positive for dizziness.       The following portions of the patient's history were reviewed and updated as appropriate: allergies, current medications and problem list.    Patient Active Problem List    Diagnosis Date Noted   • Osteopenia of multiple sites 04/26/2022   • Primary localized osteoarthrosis of left lower leg 05/20/2021   • Arthritis of foot 11/03/2016   • Penicillin allergy 10/12/2016   • Atopic rhinitis 08/15/2016   • Hyperlipidemia 08/15/2016       Current Outpatient Medications on File Prior to Visit   Medication Sig Dispense Refill   • albuterol sulfate  (90 Base) MCG/ACT inhaler Inhale 2 puffs Every 4 (Four) Hours As Needed for Wheezing. 18 g 0   • cetirizine (zyrTEC) 10 MG tablet Take 10 mg by mouth As Needed for Allergies.     • mometasone (ELOCON) 0.1 % cream APPLY ONCE OR TWICE DAILY AS NEEDED TO RASH ON FACE AND EARS     • naproxen sodium (ALEVE) 220 MG tablet Take 220 mg by mouth 2 (Two) Times a Day As Needed.     • [DISCONTINUED] azithromycin (Zithromax Z-Cain) 250 MG tablet Take 2 tablets the first day, then 1 tablet daily for 4 days. 6 tablet 0   • [DISCONTINUED] guaiFENesin-codeine (GUAIFENESIN AC) 100-10 MG/5ML liquid Take 5 mL  "by mouth 3 (Three) Times a Day As Needed for Cough. 118 mL 0     No current facility-administered medications on file prior to visit.       Objective     /90   Pulse 68   Ht 161.3 cm (63.5\")   Wt 65.8 kg (145 lb)   SpO2 98%   BMI 25.28 kg/m²     Physical Exam  Constitutional:       Appearance: She is well-developed.   HENT:      Head: Normocephalic and atraumatic.   Cardiovascular:      Rate and Rhythm: Normal rate and regular rhythm.      Heart sounds: Normal heart sounds.   Pulmonary:      Effort: Pulmonary effort is normal.      Breath sounds: Normal breath sounds.   Skin:     General: Skin is warm and dry.   Neurological:      Mental Status: She is alert and oriented to person, place, and time.   Psychiatric:         Behavior: Behavior normal.       ECG 12 Lead    Date/Time: 5/3/2022 1:52 PM  Performed by: Frieda Murray MD  Authorized by: Frieda Murray MD   Comparison: compared with previous ECG from 7/20/2015  Similar to previous ECG  Rhythm: sinus rhythm  Rate: normal  Conduction: conduction normal  ST Segments: ST segments normal  T Waves: T waves normal  QRS axis: left    Clinical impression: non-specific ECG              Assessment/Plan   Diagnoses and all orders for this visit:    1. Dizziness (Primary)    Other orders  -     ECG 12 Lead        Discussion    Patient presents with transient dizziness after recent URI and poor po intake.  She is feeling better today.  EKG is unchanged.  She is encouraged to drink plenty of fluids.  The patient is instructed to let our office know if not feeling better over the next several days or if there is any change in symptoms.           Future Appointments   Date Time Provider Department Center   5/13/2022 12:45 PM  [facility]   5/27/2022  9:00 AM Frieda Murray MD [facility]         "

## 2022-05-13 ENCOUNTER — APPOINTMENT (OUTPATIENT)
Dept: WOMENS IMAGING | Facility: HOSPITAL | Age: 62
End: 2022-05-13

## 2022-05-13 ENCOUNTER — HOSPITAL ENCOUNTER (OUTPATIENT)
Dept: CARDIOLOGY | Facility: HOSPITAL | Age: 62
Discharge: HOME | End: 2022-05-13
Admitting: INTERNAL MEDICINE

## 2022-05-13 VITALS
HEIGHT: 62 IN | SYSTOLIC BLOOD PRESSURE: 130 MMHG | BODY MASS INDEX: 27.23 KG/M2 | DIASTOLIC BLOOD PRESSURE: 80 MMHG | HEART RATE: 58 BPM | WEIGHT: 148 LBS

## 2022-05-13 DIAGNOSIS — Z13.6 ENCOUNTER FOR SCREENING FOR VASCULAR DISEASE: ICD-10-CM

## 2022-05-13 LAB
BH CV ECHO MEAS - DIST AO DIAM: 1.57 CM
BH CV VAS BP LEFT ARM: NORMAL MMHG
BH CV VAS BP RIGHT ARM: NORMAL MMHG
BH CV XLRA MEAS - MID AO DIAM: 1.75 CM
BH CV XLRA MEAS - PAD LEFT ABI DP: 1.02
BH CV XLRA MEAS - PAD LEFT ABI PT: 1.15
BH CV XLRA MEAS - PAD LEFT ARM: 128 MMHG
BH CV XLRA MEAS - PAD LEFT LEG DP: 133 MMHG
BH CV XLRA MEAS - PAD LEFT LEG PT: 149 MMHG
BH CV XLRA MEAS - PAD RIGHT ABI DP: 1.02
BH CV XLRA MEAS - PAD RIGHT ABI PT: 1.13
BH CV XLRA MEAS - PAD RIGHT ARM: 130 MMHG
BH CV XLRA MEAS - PAD RIGHT LEG DP: 132 MMHG
BH CV XLRA MEAS - PAD RIGHT LEG PT: 147 MMHG
BH CV XLRA MEAS - PROX AO DIAM: 1.95 CM
BH CV XLRA MEAS LEFT ICA/CCA RATIO: 1.09
BH CV XLRA MEAS LEFT MID CCA PSV: NORMAL CM/SEC
BH CV XLRA MEAS LEFT MID ICA PSV: NORMAL CM/SEC
BH CV XLRA MEAS LEFT PROX ECA PSV: NORMAL CM/SEC
BH CV XLRA MEAS RIGHT ICA/CCA RATIO: 0.89
BH CV XLRA MEAS RIGHT MID CCA PSV: NORMAL CM/SEC
BH CV XLRA MEAS RIGHT MID ICA PSV: NORMAL CM/SEC
BH CV XLRA MEAS RIGHT PROX ECA PSV: NORMAL CM/SEC
MAXIMAL PREDICTED HEART RATE: 160 BPM
STRESS TARGET HR: 136 BPM

## 2022-05-13 PROCEDURE — 93799 UNLISTED CV SVC/PROCEDURE: CPT

## 2022-05-13 PROCEDURE — 77063 BREAST TOMOSYNTHESIS BI: CPT | Performed by: RADIOLOGY

## 2022-05-13 PROCEDURE — 77067 SCR MAMMO BI INCL CAD: CPT | Performed by: RADIOLOGY

## 2022-06-01 ENCOUNTER — TELEMEDICINE (OUTPATIENT)
Dept: INTERNAL MEDICINE | Facility: CLINIC | Age: 62
End: 2022-06-01

## 2022-06-01 DIAGNOSIS — J20.8 ACUTE BRONCHITIS DUE TO OTHER SPECIFIED ORGANISMS: Primary | ICD-10-CM

## 2022-06-01 PROCEDURE — 99213 OFFICE O/P EST LOW 20 MIN: CPT | Performed by: INTERNAL MEDICINE

## 2022-06-01 RX ORDER — DOXYCYCLINE 100 MG/1
100 CAPSULE ORAL EVERY 12 HOURS SCHEDULED
Qty: 14 CAPSULE | Refills: 0 | Status: SHIPPED | OUTPATIENT
Start: 2022-06-01 | End: 2022-07-15

## 2022-06-01 NOTE — PROGRESS NOTES
Subjective     Cecilia Mejia is a 62 y.o. female who presents with   Chief Complaint   Patient presents with   • URI       History of Present Illness     C/o cough and fever.  Started with symptoms on Sunday.  Sore throat and cough.  Fever up to 101.4.  Rapid COVID home test was negative.  No head or nasal congestion.  Cough is deep.      Review of Systems   Constitutional: Positive for fatigue.   HENT: Positive for sore throat.    Respiratory: Positive for cough. Negative for shortness of breath and wheezing.        The following portions of the patient's history were reviewed and updated as appropriate: allergies, current medications and problem list.    Patient Active Problem List    Diagnosis Date Noted   • Osteopenia of multiple sites 04/26/2022   • Primary localized osteoarthrosis of left lower leg 05/20/2021   • Arthritis of foot 11/03/2016   • Penicillin allergy 10/12/2016   • Atopic rhinitis 08/15/2016   • Hyperlipidemia 08/15/2016       Current Outpatient Medications on File Prior to Visit   Medication Sig Dispense Refill   • albuterol sulfate  (90 Base) MCG/ACT inhaler Inhale 2 puffs Every 4 (Four) Hours As Needed for Wheezing. 18 g 0   • cetirizine (zyrTEC) 10 MG tablet Take 10 mg by mouth As Needed for Allergies.     • mometasone (ELOCON) 0.1 % cream APPLY ONCE OR TWICE DAILY AS NEEDED TO RASH ON FACE AND EARS     • naproxen sodium (ALEVE) 220 MG tablet Take 220 mg by mouth 2 (Two) Times a Day As Needed.       No current facility-administered medications on file prior to visit.       Objective     There were no vitals taken for this visit.    Physical Exam  Constitutional:       Appearance: She is well-developed.   HENT:      Head: Normocephalic and atraumatic.   Pulmonary:      Effort: Pulmonary effort is normal.   Neurological:      Mental Status: She is alert and oriented to person, place, and time.   Psychiatric:         Behavior: Behavior normal.         Assessment & Plan   Diagnoses and all  orders for this visit:    1. Acute bronchitis due to other specified organisms (Primary)    Other orders  -     doxycycline (MONODOX) 100 MG capsule; Take 1 capsule by mouth Every 12 (Twelve) Hours.  Dispense: 14 capsule; Refill: 0        Discussion    Patient presents with episode of acute bronchitis.  A prescription for antibiotics is provided today.  The patient is instructed to take along with Mucinex DM.  Let me know they are not feeling better over the next 3 days or if there is any change in symptoms.             No future appointments.

## 2022-07-15 ENCOUNTER — TELEMEDICINE (OUTPATIENT)
Dept: INTERNAL MEDICINE | Facility: CLINIC | Age: 62
End: 2022-07-15

## 2022-07-15 DIAGNOSIS — U07.1 COVID-19: Primary | ICD-10-CM

## 2022-07-15 PROCEDURE — 99213 OFFICE O/P EST LOW 20 MIN: CPT | Performed by: NURSE PRACTITIONER

## 2022-07-15 NOTE — PATIENT INSTRUCTIONS
I recommend attention to rest and fluids. I recommend as needed tylenol for fevers and aches. I recommend adding OTC vitamin D, C and zinc. I recommend monitoring pulse oximetry if you have access to that. You should quarantine for ten days.  It can be five days if you are feeling better and fever free but you should continue to wear a mask for a total of ten days around others. Paxlovid is an FDA approved for emergency use.  Important considerations include numerous drug interactions.  Symptoms could rebound in some after a course of this medication according to recent reports.    Reasons to go to the ER include severe trouble breathing, chest pain, confusion, inability to wake or stay awake, and bluish lips or face.  Continue supportive measures and let me know if there is any change in symptoms.

## 2022-07-15 NOTE — PROGRESS NOTES
Subjective   Cecilia Mejia is a 62 y.o. female.   Chief Complaint   Patient presents with   • Covid-19 Home Monitoring Video Visit     There were no vitals filed for this visit.  No LMP recorded.    History of Present Illness  Cecilia is a 62 year old female patient of Dr Murray who is being seen via telemedicine for an acute visit. She c/o fever, cough, congestion, and body aches that started yesterday. She had a positive covid test yesterday. She is vaccinated and has had one booster     The following portions of the patient's history were reviewed and updated as appropriate: allergies, current medications, past family history, past medical history, past social history, past surgical history and problem list.    Review of Systems   Constitutional: Positive for fever (102.8 t max ).   HENT: Positive for congestion and rhinorrhea.    Respiratory: Positive for cough. Negative for chest tightness, shortness of breath and wheezing.    Musculoskeletal: Positive for arthralgias, back pain and neck pain.   Neurological: Positive for headaches.     The following data was reviewed by: NICOLAS Stephenson on 07/15/2022:  CMP    CMP 4/26/22   Glucose 95   BUN 11   Creatinine 0.78   Sodium 140   Potassium 4.3   Chloride 101   Calcium 10.0   Total Protein 6.9   Albumin 4.7   Globulin 2.2   Total Bilirubin 1.1   Alkaline Phosphatase 108   AST (SGOT) 18   ALT (SGPT) 16                 Objective   Physical Exam  Constitutional:       General: She is not in acute distress.  Neurological:      Mental Status: She is alert.         Assessment & Plan   Diagnoses and all orders for this visit:    1. COVID-19 (Primary)  -     Nirmatrelvir & Ritonavir (PAXLOVID) 20 x 150 MG & 10 x 100MG tablet therapy pack tablet; Take 3 tablets by mouth 2 (Two) Times a Day for 5 days.  Dispense: 30 tablet; Refill: 0      Discussed paxlovid - reviewed medications and  Last GFR, counseled pt that is approved for emergency use only and verbal consent was  obtained  I recommend attention to rest and fluids. I recommend as needed tylenol for fevers and aches. I recommend adding OTC vitamin D, C and zinc. I recommend monitoring pulse oximetry if you have access to that. You should quarantine for ten days.  It can be five days if you are feeling better and fever free but you should continue to wear a mask for a total of ten days around others. Paxlovid is an FDA approved for emergency use.  Important considerations include numerous drug interactions.  Symptoms could rebound in some after a course of this medication according to recent reports.    Reasons to go to the ER include severe trouble breathing, chest pain, confusion, inability to wake or stay awake, and bluish lips or face.  Continue supportive measures and let me know if there is any change in symptoms.